# Patient Record
Sex: FEMALE | Race: BLACK OR AFRICAN AMERICAN | Employment: FULL TIME | URBAN - METROPOLITAN AREA
[De-identification: names, ages, dates, MRNs, and addresses within clinical notes are randomized per-mention and may not be internally consistent; named-entity substitution may affect disease eponyms.]

---

## 2017-02-09 ENCOUNTER — OFFICE VISIT (OUTPATIENT)
Dept: FAMILY MEDICINE | Facility: CLINIC | Age: 62
End: 2017-02-09

## 2017-02-09 VITALS
SYSTOLIC BLOOD PRESSURE: 142 MMHG | TEMPERATURE: 97.9 F | DIASTOLIC BLOOD PRESSURE: 90 MMHG | OXYGEN SATURATION: 97 % | WEIGHT: 171 LBS | RESPIRATION RATE: 18 BRPM | HEART RATE: 78 BPM | BODY MASS INDEX: 23.86 KG/M2

## 2017-02-09 DIAGNOSIS — R00.2 PALPITATIONS: ICD-10-CM

## 2017-02-09 DIAGNOSIS — R53.83 OTHER FATIGUE: ICD-10-CM

## 2017-02-09 DIAGNOSIS — G43.009 MIGRAINE WITHOUT AURA AND WITHOUT STATUS MIGRAINOSUS, NOT INTRACTABLE: ICD-10-CM

## 2017-02-09 DIAGNOSIS — Z23 NEED FOR INFLUENZA VACCINATION: ICD-10-CM

## 2017-02-09 DIAGNOSIS — H61.22 IMPACTED CERUMEN OF LEFT EAR: ICD-10-CM

## 2017-02-09 DIAGNOSIS — I10 BENIGN ESSENTIAL HYPERTENSION: Primary | ICD-10-CM

## 2017-02-09 LAB
% GRANULOCYTES: 61.2 %G (ref 40–75)
BUN SERPL-MCNC: 13.1 MG/DL (ref 7–19)
CALCIUM SERPL-MCNC: 10.2 MG/DL (ref 8.5–10.1)
CHLORIDE SERPLBLD-SCNC: 102.3 MMOL/L (ref 98–110)
CO2 SERPL-SCNC: 30.1 MMOL/L (ref 20–32)
CREAT SERPL-MCNC: 0.8 MG/DL (ref 0.5–1)
GFR SERPL CREATININE-BSD FRML MDRD: 78 ML/MIN/1.7 M2
GLUCOSE SERPL-MCNC: 100.6 MG'DL (ref 70–99)
GRANULOCYTES #: 2.5 K/UL (ref 1.6–8.3)
HCT VFR BLD AUTO: 39.1 % (ref 35–47)
HEMOGLOBIN: 12.3 G/DL (ref 11.7–15.7)
LYMPHOCYTES # BLD AUTO: 1.3 K/UL (ref 0.8–5.3)
LYMPHOCYTES NFR BLD AUTO: 32.6 %L (ref 20–48)
MCH RBC QN AUTO: 27 PG (ref 26.5–35)
MCHC RBC AUTO-ENTMCNC: 31.5 G/DL (ref 32–36)
MCV RBC AUTO: 85.9 FL (ref 78–100)
MID #: 0.3 K/UL (ref 0–2.2)
MID %: 6.2 %M (ref 0–20)
PLATELET # BLD AUTO: 261 K/UL (ref 150–450)
POTASSIUM SERPL-SCNC: 3.4 MMOL/DL (ref 3.3–4.5)
RBC # BLD AUTO: 4.55 M/UL (ref 3.8–5.2)
SODIUM SERPL-SCNC: 139.7 MMOL/L (ref 132.6–141.4)
TSH SERPL DL<=0.005 MIU/L-ACNC: 0.55 MU/L (ref 0.4–4)
WBC # BLD AUTO: 4.1 K/UL (ref 4–11)

## 2017-02-09 RX ORDER — CHOLECALCIFEROL (VITAMIN D3) 50 MCG
2000 TABLET ORAL DAILY
Qty: 100 TABLET | Refills: 3 | Status: SHIPPED | OUTPATIENT
Start: 2017-02-09

## 2017-02-09 RX ORDER — SUMATRIPTAN 50 MG/1
50 TABLET, FILM COATED ORAL
Qty: 18 TABLET | Refills: 3 | Status: SHIPPED | OUTPATIENT
Start: 2017-02-09 | End: 2017-04-12

## 2017-02-09 RX ORDER — HYDROCHLOROTHIAZIDE 12.5 MG/1
12.5 TABLET ORAL DAILY
Qty: 90 TABLET | Refills: 3 | Status: SHIPPED | OUTPATIENT
Start: 2017-02-09 | End: 2017-04-10

## 2017-02-09 ASSESSMENT — ENCOUNTER SYMPTOMS
SORE THROAT: 0
COUGH: 0
LIGHT-HEADEDNESS: 0
SHORTNESS OF BREATH: 0
WHEEZING: 0
RHINORRHEA: 0
HEADACHES: 1
PALPITATIONS: 1
DIZZINESS: 0
NECK PAIN: 1
ABDOMINAL PAIN: 0
FATIGUE: 0
NAUSEA: 0

## 2017-02-09 NOTE — PROGRESS NOTES
HPI:       Robert Marcial is a 61 year old who presents for the following  Patient presents with:  Ear Problem: left side ear pain   Anxiety: at night heart races       Migraine Follow-Up    Headaches symptoms:  Improved    Frequency: decreasing     Able to do normal daily activities/work with headaches: Yes    Rescue/Relief medication:sumatriptan (Imitrex), tylenol             Effectiveness: total relief    Preventative medication: None    Neurologic complications: No new stroke-like symptoms, loss of vision or speech, numbness or weakness    In the past 4 weeks, how often have you gone to Urgent Care or the emergency room because of your headaches?  0     Hypertension Follow-up      Outpatient blood pressures are being checked at home, every other day.  Results are 120-130/70-80.    Chest Pain? :No     Low Salt Diet: no added salt    Daily NSAID Use?No     Did patient take their HTN pills today/last night as usual?  Yes       Out of imitrix  Adherence and Exercise  Medication side effects: no  How often is a medication missed? Never  Are you able to follow the treatment plan? Yes  Exercise:walking  2-3 days/week for an average of 15-30 minutes     Palpitations: For the past several weeks she has experienced palpitations of the heart that occur only at night and resolve on their own after short duration. She denies any associated SOB, dizziness, chest pain, or lightheadedness. She drinks one cup of regular coffee in the morning. She reports family history of cardiovascular disease. She has had a stress ECG done in 2014 that was normal.      Ear Pain: For about a week she has experienced left ear discomfort described as fullness. She feels it could be related to her ongoing migraine and associated neck pain on the left side. She denies any changes in her hearing or tinnitus. She reports using que-tips to clean her ear. She washes her hair once a week.     Problem, Medication and Allergy Lists were reviewed  and are current.  Patient is an established patient of this clinic.         Review of Systems:   Review of Systems   Constitutional: Negative for fatigue.   HENT: Positive for ear pain (L). Negative for congestion, rhinorrhea and sore throat.    Respiratory: Negative for cough, shortness of breath and wheezing.    Cardiovascular: Positive for palpitations. Negative for chest pain.   Gastrointestinal: Negative for nausea and abdominal pain.   Musculoskeletal: Positive for neck pain.   Skin: Negative for rash.   Neurological: Positive for headaches. Negative for dizziness, syncope and light-headedness.             Physical Exam:     Patient Vitals for the past 24 hrs:   BP Temp Temp src Pulse Resp SpO2 Weight   02/09/17 0825 142/90 mmHg 97.9  F (36.6  C) Oral 78 18 97 % 171 lb (77.565 kg)     Body mass index is 23.86 kg/(m^2).  Vital signs normal except slightly elevated blood pressure     Physical Exam   Constitutional: She is oriented to person, place, and time. She appears well-developed and well-nourished. No distress.   HENT:   Head: Normocephalic and atraumatic.   Right Ear: Tympanic membrane normal.   Left ear with cerumen impaction. Unable to visualize TM.    Eyes: Conjunctivae and EOM are normal.   Neck: Neck supple.   Cardiovascular: Normal rate, regular rhythm and normal heart sounds.  Exam reveals no gallop and no friction rub.    No murmur heard.  Pulmonary/Chest: Effort normal and breath sounds normal. No respiratory distress. She has no wheezes. She has no rales.   Musculoskeletal: Normal range of motion. She exhibits no edema.   Neurological: She is alert and oriented to person, place, and time.   Skin: Skin is warm and dry. No rash noted. She is not diaphoretic.   Psychiatric: She has a normal mood and affect.   Vitals reviewed.        Results:      Results from the last 24 hours  Results for orders placed or performed in visit on 02/09/17 (from the past 24 hour(s))   CBC with Diff Plt ([facility])    Result Value Ref Range    WBC 4.1 4.0 - 11.0 K/uL    Lymphocytes # 1.3 0.8 - 5.3 K/uL    % Lymphocytes 32.6 20.0 - 48.0 %L    Mid # 0.3 0.0 - 2.2 K/uL    Mid % 6.2 0.0 - 20.0 %M    GRANULOCYTES # 2.5 1.6 - 8.3 K/uL    % Granulocytes 61.2 40.0 - 75.0 %G    RBC 4.55 3.80 - 5.20 M/uL    Hemoglobin 12.3 11.7 - 15.7 g/dL    Hematocrit 39.1 35.0 - 47.0 %    MCV 85.9 78.0 - 100.0 fL    MCH 27.0 26.5 - 35.0 pg    MCHC 31.5 (L) 32.0 - 36.0 g/dL    Platelets 261.0 150.0 - 450.0 K/uL   Basic Metabolic Panel ([facility])   Result Value Ref Range    Urea Nitrogen 13.1 7.0 - 19.0 mg/dL    Calcium 10.2 (H) 8.5 - 10.1 mg/dL    Chloride 102.3 98.0 - 110.0 mmol/L    Carbon Dioxide 30.1 20.0 - 32.0 mmol/L    Creatinine 0.8 0.5 - 1.0 mg/dL    Glucose 100.6 (H) 70.0 - 99.0 mg'dL    Potassium 3.4 3.3 - 4.5 mmol/dL    Sodium 139.7 132.6 - 141.4 mmol/L    GFR Estimate 78.0 mL/min/1.7 m2    GFR Estimate If Black 94.4 mL/min/1.7 m2     Assessment and Plan     1. Benign essential hypertension  Home monitoring has been within normal limits. No change in medication today. Meds refilled.   - hydrochlorothiazide 12.5 MG TABS tablet; Take 1 tablet (12.5 mg) by mouth daily  Dispense: 90 tablet; Refill: 3    2. Migraine without aura and without status migrainosus, not intractable  Ran out of Imitrex a week ago. This medication has worked effectively as a migraine  treatment for her however she seems to be using it several times a week. Discussed options of a preventative medicine but decided to wait to pursue this till after her heart palpitation problem has been investigated.   - SUMAtriptan (IMITREX) 50 MG tablet; Take 1 tablet (50 mg) by mouth at onset of headache for migraine May repeat dose in 2 hours.  Do not exceed 200 mg in 24 hours  Dispense: 18 tablet; Refill: 3    3. Heart Palpitations  DDX includes anxiety, hyperthyroidism, paroxysmal arrhythmia such as atrial fibrillation or runs of PVCs. Will check labs and get patient set up  with 48 hour heart monitor with plans for follow up.   - TSH with free T4 reflex  - CBC with Diff Plt ([facility])  - Zio Patch 48 Hours; Future  - Basic Metabolic Panel ([facility])    4. Cerumen impaction  Patient reporting discomfort in her hear and found to have large impaction of cerumen in left ear. Attempted retrieval with curette but was unsuccessful. Discussed Debrox with patient.   -Carbamide peroxide (DEBROX)      Medications Discontinued During This Encounter   Medication Reason     hydrochlorothiazide 12.5 MG TABS Reorder     SUMAtriptan (IMITREX) 50 MG tablet Reorder     Cholecalciferol (VITAMIN D) 2000 UNITS tablet Reorder     Options for treatment and follow-up care were reviewed with the patient. Robert Marcial  engaged in the decision making process and verbalized understanding of the options discussed and agreed with the final plan.    Audrey Medel DO    This note is scribed by Harjit Zavala, medical student on behalf of AUDREY MEDEL.    The medical student acted as scribe and the encounter documented above was completely performed by myself and the documentation reflects the work I have performed today. Audrey Medel DO

## 2017-02-09 NOTE — MR AVS SNAPSHOT
After Visit Summary   2/9/2017    Robert Marcial    MRN: [id number]           Patient Information     Date Of Birth          1955        Visit Information        Provider Department      2/9/2017 9:00 AM Tip Medel DO [facility]        Today's Diagnoses     Benign essential hypertension    -  1     Migraine without aura and without status migrainosus, not intractable         Other fatigue         Palpitations            Follow-ups after your visit        Future tests that were ordered for you today     Open Future Orders        Priority Expected Expires Ordered    Zio Patch 48 Hours Routine  3/26/2017 2/9/2017            Who to contact     Please call your clinic at [phone] to:    Ask questions about your health    Make or cancel appointments    Discuss your medicines    Learn about your test results    Speak to your doctor   If you have compliments or concerns about an experience at your clinic, or if you wish to file a complaint, please contact [facility] Patient Relations at [phone] or email us at [email or web address]         Additional Information About Your Visit        MyChart Information     [employer] gives you secure access to your electronic health record. If you see a primary care provider, you can also send messages to your care team and make appointments. If you have questions, please call your primary care clinic.  If you do not have a primary care provider, please call [phone] and they will assist you.      [employer] is an electronic gateway that provides easy, online access to your medical records. With [employer], you can request a clinic appointment, read your test results, renew a prescription or communicate with your care team.     To access your existing account, please contact your [facility] or call [phone] for assistance.        Care EveryWhere ID     This is your  Care EveryWhere ID. This could be used by other organizations to access your [facility] medical records  [id number]        Your Vitals Were     Pulse Temperature Respirations Pulse Oximetry Breastfeeding?       78 97.9  F (36.6  C) (Oral) 18 97% No        Blood Pressure from Last 3 Encounters:   02/09/17 142/90   10/20/16 113/74   07/25/16 115/81    Weight from Last 3 Encounters:   02/09/17 171 lb (77.565 kg)   10/20/16 166 lb (75.297 kg)   07/25/16 166 lb (75.297 kg)              We Performed the Following     Basic Metabolic Panel ([facility])     CBC with Diff Plt ([facility])     TSH with free T4 reflex          Where to get your medicines      These medications were sent to [facility] [place], [place] - [address] [place] [address]     Phone:  [phone]    - hydrochlorothiazide 12.5 MG Tabs tablet  - SUMAtriptan 50 MG tablet  - vitamin D 2000 UNITS tablet       Primary Care Provider Office Phone # Fax #    Tip Medel [phone]       [facility] [address]  [facility] [address]        Thank you!     Thank you for choosing [facility]  for your care. Our goal is always to provide you with excellent care. Hearing back from our patients is one way we can continue to improve our services. Please take a few minutes to complete the written survey that you may receive in the mail after your visit with us. Thank you!             Your Updated Medication List - Protect others around you: Learn how to safely use, store and throw away your medicines at www.disposemymeds.org.          This list is accurate as of: 2/9/17  9:31 AM.  Always use your most recent med list.                   Brand Name Dispense Instructions for use    hydrochlorothiazide 12.5 MG Tabs tablet     90 tablet    Take 1 tablet (12.5 mg) by mouth daily       NO ACTIVE MEDICATIONS          order for DME     1 each    Equipment being ordered: blood pressure cuff        SUMAtriptan 50 MG tablet    IMITREX    18 tablet    Take 1 tablet (50 mg) by mouth at onset of headache for migraine May repeat dose in 2 hours.  Do not exceed 200 mg in 24 hours       vitamin D 2000 UNITS tablet     100 tablet    Take 2,000 Units by mouth daily

## 2017-02-15 NOTE — PROGRESS NOTES
Preceptor Attestation:   Patient seen and discussed with the resident. Assessment and plan reviewed with resident and agreed upon.   Supervising Physician:  Marcelina Holly MD

## 2017-04-10 ENCOUNTER — OFFICE VISIT (OUTPATIENT)
Dept: FAMILY MEDICINE | Facility: CLINIC | Age: 62
End: 2017-04-10

## 2017-04-10 VITALS
HEART RATE: 64 BPM | TEMPERATURE: 98.2 F | RESPIRATION RATE: 18 BRPM | WEIGHT: 171 LBS | BODY MASS INDEX: 23.94 KG/M2 | DIASTOLIC BLOOD PRESSURE: 84 MMHG | SYSTOLIC BLOOD PRESSURE: 133 MMHG | HEIGHT: 71 IN | OXYGEN SATURATION: 98 %

## 2017-04-10 DIAGNOSIS — E87.6 HYPOKALEMIA: ICD-10-CM

## 2017-04-10 DIAGNOSIS — Z12.31 ENCOUNTER FOR SCREENING MAMMOGRAM FOR BREAST CANCER: ICD-10-CM

## 2017-04-10 DIAGNOSIS — G43.009 MIGRAINE WITHOUT AURA AND WITHOUT STATUS MIGRAINOSUS, NOT INTRACTABLE: Primary | ICD-10-CM

## 2017-04-10 DIAGNOSIS — I10 BENIGN ESSENTIAL HYPERTENSION: ICD-10-CM

## 2017-04-10 LAB
BUN SERPL-MCNC: 11.5 MG/DL (ref 7–19)
CALCIUM SERPL-MCNC: 9.6 MG/DL (ref 8.5–10.1)
CHLORIDE SERPLBLD-SCNC: 99.7 MMOL/L (ref 98–110)
CO2 SERPL-SCNC: 31.8 MMOL/L (ref 20–32)
CREAT SERPL-MCNC: 0.7 MG/DL (ref 0.5–1)
GFR SERPL CREATININE-BSD FRML MDRD: >90 ML/MIN/1.7 M2
GLUCOSE SERPL-MCNC: 125.1 MG'DL (ref 70–99)
POTASSIUM SERPL-SCNC: 3.1 MMOL/DL (ref 3.3–4.5)
SODIUM SERPL-SCNC: 139.1 MMOL/L (ref 132.6–141.4)

## 2017-04-10 RX ORDER — HYDROCHLOROTHIAZIDE 12.5 MG/1
12.5 TABLET ORAL DAILY
Qty: 90 TABLET | Refills: 3 | Status: SHIPPED | OUTPATIENT
Start: 2017-04-10 | End: 2017-05-08

## 2017-04-10 RX ORDER — AMITRIPTYLINE HYDROCHLORIDE 10 MG/1
10 TABLET ORAL AT BEDTIME
Qty: 90 TABLET | Refills: 1 | Status: SHIPPED | OUTPATIENT
Start: 2017-04-10

## 2017-04-10 NOTE — MR AVS SNAPSHOT
After Visit Summary   4/10/2017    Robert Marcial    MRN: [id number]           Patient Information     Date Of Birth          1955        Visit Information        Provider Department      4/10/2017 4:00 PM Tip Medel DO [facility]        Today's Diagnoses     Migraine without aura and without status migrainosus, not intractable    -  1    Benign essential hypertension        Encounter for screening mammogram for breast cancer           Follow-ups after your visit        Your next 10 appointments already scheduled     Apr 10, 2017  4:00 PM CDT   Return Visit with DO [facility] ([facility])    [address] [place] [address],  [address]  [facility] [address]   [phone]              Future tests that were ordered for you today     Open Future Orders        Priority Expected Expires Ordered    Screening Mammogram Digital Bilateral Routine  4/10/2018 4/10/2017            Who to contact     Please call your clinic at [phone] to:    Ask questions about your health    Make or cancel appointments    Discuss your medicines    Learn about your test results    Speak to your doctor   If you have compliments or concerns about an experience at your clinic, or if you wish to file a complaint, please contact [facility] Patient Relations at [phone] or email us at [email or web address]         Additional Information About Your Visit        MyChart Information     [facility] gives you secure access to your electronic health record. If you see a primary care provider, you can also send messages to your care team and make appointments. If you have questions, please call your primary care clinic.  If you do not have a primary care provider, please call [phone] and they will assist you.      [facility] is an electronic gateway that provides easy, online access to your medical records. With [facility], you can  "request a clinic appointment, read your test results, renew a prescription or communicate with your care team.     To access your existing account, please contact your [facility] or call [phone] for assistance.        Care EveryWhere ID     This is your Care EveryWhere ID. This could be used by other organizations to access your [facility] medical records  [id number]        Your Vitals Were     Pulse Temperature Respirations Height Pulse Oximetry BMI (Body Mass Index)    64 98.2  F (36.8  C) (Oral) 18 5' 11\" (180.3 cm) 98% 23.85 kg/m2       Blood Pressure from Last 3 Encounters:   04/10/17 133/84   02/09/17 142/90   10/20/16 113/74    Weight from Last 3 Encounters:   04/10/17 171 lb (77.6 kg)   02/09/17 171 lb (77.6 kg)   10/20/16 166 lb (75.3 kg)              We Performed the Following     Basic Metabolic Panel ([facility])          Today's Medication Changes          These changes are accurate as of: 4/10/17  3:53 PM.  If you have any questions, ask your nurse or doctor.               Start taking these medicines.        Dose/Directions    amitriptyline 10 MG tablet   Commonly known as:  ELAVIL   Used for:  Migraine without aura and without status migrainosus, not intractable   Started by:  Tip Medel DO        Dose:  10 mg   Take 1 tablet (10 mg) by mouth At Bedtime   Quantity:  90 tablet   Refills:  1            Where to get your medicines      These medications were sent to [facility] [place], [place] - [address] [place] [id number]     Phone:  [phone]     amitriptyline 10 MG tablet    hydrochlorothiazide 12.5 MG Tabs tablet                Primary Care Provider Office Phone # Fax #    Tip Medel [phone]       [facility] [address]  [facility] [address]        Thank you!     Thank you for choosing [facility]  for your care. Our goal is always to provide you with excellent care. " Hearing back from our patients is one way we can continue to improve our services. Please take a few minutes to complete the written survey that you may receive in the mail after your visit with us. Thank you!             Your Updated Medication List - Protect others around you: Learn how to safely use, store and throw away your medicines at www.disposemymeds.org.          This list is accurate as of: 4/10/17  3:53 PM.  Always use your most recent med list.                   Brand Name Dispense Instructions for use    amitriptyline 10 MG tablet    ELAVIL    90 tablet    Take 1 tablet (10 mg) by mouth At Bedtime       carbamide peroxide 6.5 % otic solution    DEBROX    30 mL    Place 10 drops Into the left ear daily as needed for other       hydrochlorothiazide 12.5 MG Tabs tablet     90 tablet    Take 1 tablet (12.5 mg) by mouth daily       NO ACTIVE MEDICATIONS          order for DME     1 each    Equipment being ordered: blood pressure cuff       SUMAtriptan 50 MG tablet    IMITREX    18 tablet    Take 1 tablet (50 mg) by mouth at onset of headache for migraine May repeat dose in 2 hours.  Do not exceed 200 mg in 24 hours       vitamin D 2000 UNITS tablet     100 tablet    Take 2,000 Units by mouth daily

## 2017-04-10 NOTE — LETTER
[facility]   [address],  [address]  [facility] [address]  [phone]    April 10, 2017    Robert Marcial  [address] [facility] [address]  [phone] (home)     : 1955      To Whom it may concern:    Robert Marcial was seen in our clinic today, April 10, 2017.  I think the pain in her left arm is associated with her typing ergonomics.  I think she would benefit from a separate keyboard and mouse to improve ergonomics.      Sincerely,        Tip Medel, DO

## 2017-04-10 NOTE — PROGRESS NOTES
Preceptor Attestation:   Patient seen and discussed with the resident. Assessment and plan reviewed with resident and agreed upon.   Supervising Physician:  Joe Walker MD  [facility]

## 2017-04-10 NOTE — PROGRESS NOTES
"      HPI:       Robert Marcial is a 62 year old who presents for the following  Patient presents with:  Pain: cramping, left arm, stiff, sore,8/10 pain scale, Hs, pt requesting lab results, left foot swelling  Refill Request: Medication refill    Left foot swelling.  For a few days.  Very mild.  No pain in calf      Has some pain in her left arm.  Just feels like a sore, stiffness.  Rearranged her seating area.  Types on her laptop at this point.      Headaches.  Have been slowing down.  Gets them at least 3 days a week.             Problem, Medication and Allergy Lists were reviewed and are current.  Patient is an established patient of this clinic.         Review of Systems:   Review of Systems   Constitutional: Negative for chills and fever.   HENT: Negative for congestion and sore throat.    Eyes: Negative for visual disturbance.   Respiratory: Negative for cough and shortness of breath.    Cardiovascular: Negative for chest pain.   Gastrointestinal: Negative for constipation, diarrhea, nausea and vomiting.   Genitourinary: Negative for difficulty urinating.   Musculoskeletal: Negative for back pain.        Left arm pain   Skin: Negative for rash.   Neurological: Positive for headaches. Negative for dizziness and light-headedness.   Psychiatric/Behavioral: Negative for dysphoric mood.             Physical Exam:   Patient Vitals for the past 24 hrs:   BP Temp Temp src Pulse Resp SpO2 Height Weight   04/10/17 1523 133/84 98.2  F (36.8  C) Oral 64 18 98 % 5' 11\" (180.3 cm) 171 lb (77.6 kg)     Body mass index is 23.85 kg/(m^2).  Vitals were reviewed and were normal     Physical Exam   Constitutional: She is oriented to person, place, and time. She appears well-developed and well-nourished.   HENT:   Head: Normocephalic and atraumatic.   Eyes: Conjunctivae and EOM are normal.   Cardiovascular: Normal rate, regular rhythm and normal heart sounds.  Exam reveals no gallop and no friction rub.    No murmur " heard.  Pulmonary/Chest: Effort normal and breath sounds normal. No respiratory distress. She has no wheezes. She has no rales. She exhibits no tenderness.   Abdominal: She exhibits no distension.   Musculoskeletal:   No pain in shoulder.  Pain significant at lateral epicondyle of humerus on left. Wrist normal   Neurological: She is alert and oriented to person, place, and time. No cranial nerve deficit.   Skin: Skin is warm and dry.   Psychiatric: She has a normal mood and affect. Her behavior is normal. Judgment and thought content normal.         Results:     Results for orders placed or performed in visit on 04/10/17   Basic Metabolic Panel ([facility])   Result Value Ref Range    Urea Nitrogen 11.5 7.0 - 19.0 mg/dL    Calcium 9.6 8.5 - 10.1 mg/dL    Chloride 99.7 98.0 - 110.0 mmol/L    Carbon Dioxide 31.8 20.0 - 32.0 mmol/L    Creatinine 0.7 0.5 - 1.0 mg/dL    Glucose 125.1 (H) 70.0 - 99.0 mg'dL    Potassium 3.1 (L) 3.3 - 4.5 mmol/dL    Sodium 139.1 132.6 - 141.4 mmol/L    GFR Estimate >90 >60.0 mL/min/1.7 m2    GFR Estimate If Black >90 >60.0 mL/min/1.7 m2       Assessment and Plan     1. Migraine without aura and without status migrainosus, not intractable  Will attempt a controller medication given that it happens 3 times a week.    - amitriptyline (ELAVIL) 10 MG tablet; Take 1 tablet (10 mg) by mouth At Bedtime  Dispense: 90 tablet; Refill: 1    2. Benign essential hypertension  Will need to consider potassium supplementation  - hydrochlorothiazide 12.5 MG TABS tablet; Take 1 tablet (12.5 mg) by mouth daily  Dispense: 90 tablet; Refill: 3  - Basic Metabolic Panel ([facility])    3. Encounter for screening mammogram for breast cancer  - Screening Mammogram Digital Bilateral; Future  There are no discontinued medications.  Options for treatment and follow-up care were reviewed with the patient. Robert Marcial  engaged in the decision making process and verbalized understanding of the options discussed and  agreed with the final plan.    Tip Medel, DO

## 2017-04-11 ENCOUNTER — TELEPHONE (OUTPATIENT)
Dept: FAMILY MEDICINE | Facility: CLINIC | Age: 62
End: 2017-04-11

## 2017-04-11 DIAGNOSIS — G43.009 MIGRAINE WITHOUT AURA AND WITHOUT STATUS MIGRAINOSUS, NOT INTRACTABLE: ICD-10-CM

## 2017-04-11 NOTE — TELEPHONE ENCOUNTER
"[facility] phone call message- medication clarification/question:    Full Medication Name: amitriptyline (ELAVIL) 10 MG tablet    Question: Patient states she takes the above medication for headaches, but does not like the side effects. She states she takes it at night, but feels drowsy and groggy the next day. She would like to continue taking what she was on previously for headaches, but did not know the name. She states the medication comes \"in a box\".     Pharmacy confirmed as   [facility]/pharmacy #[id number] - [address] [address]  Phone: [phone] Fax: [phone]  : Yes    OK to leave a message on voice mail? Yes    Call taken on April 11, 2017 at 11:55 AM by Rafy Cardona    "

## 2017-04-11 NOTE — TELEPHONE ENCOUNTER
Message routed to PCP to prescribe requested previous medication if appropriate.    Wendy Hughes RN

## 2017-04-12 RX ORDER — SUMATRIPTAN 50 MG/1
50 TABLET, FILM COATED ORAL
Qty: 18 TABLET | Refills: 3 | Status: SHIPPED | OUTPATIENT
Start: 2017-04-12

## 2017-04-12 NOTE — TELEPHONE ENCOUNTER
Please let her know it is okay to stop the amitriptyline.  I will send in a new prescription for sumatriptan, which is the one she takes for her headaches that works really well.  Please let her know that we have several options for prevention medications and I would like to try another one in a few weeks.  I think she would benefit from preventing them and I have confidence that we will find one that will work for her and will not bother her in terms of side effects.  Please have her schedule in a 2-3 weeks with me if possible.      Thanks,    Tip

## 2017-04-20 ENCOUNTER — TELEPHONE (OUTPATIENT)
Dept: FAMILY MEDICINE | Facility: CLINIC | Age: 62
End: 2017-04-20

## 2017-04-20 RX ORDER — POTASSIUM CHLORIDE 750 MG/1
10 CAPSULE, EXTENDED RELEASE ORAL 2 TIMES DAILY
Qty: 60 CAPSULE | Refills: 1 | Status: SHIPPED | OUTPATIENT
Start: 2017-04-20

## 2017-04-20 ASSESSMENT — ENCOUNTER SYMPTOMS
CHILLS: 0
CONSTIPATION: 0
SHORTNESS OF BREATH: 0
LIGHT-HEADEDNESS: 0
SORE THROAT: 0
COUGH: 0
DYSPHORIC MOOD: 0
FEVER: 0
DIFFICULTY URINATING: 0
NAUSEA: 0
DIZZINESS: 0
BACK PAIN: 0
HEADACHES: 1
VOMITING: 0
DIARRHEA: 0

## 2017-04-20 NOTE — TELEPHONE ENCOUNTER
Called patient and relayed message below.    Verbalized understanding. No further questions.    Lauryn Jacob RN

## 2017-04-20 NOTE — TELEPHONE ENCOUNTER
----- Message from Tip Medel DO sent at 4/20/2017  6:57 AM CDT -----  Regarding: please call about results  Please call patient with this information. Pt has a mildly low potassium.  Likely from the hydrochlorathiazide.  Has normal kidney function.  Recommend using a potassium supplement which I have sent to the pharmacy daily.  Because she has normal kidneys she can clear extra potassium, I just don't want it to go too low because of the HCTZ.      Thanks,  Tip Medel D.O.  [facility] Resident  [phone]

## 2017-05-08 ENCOUNTER — TELEPHONE (OUTPATIENT)
Dept: FAMILY MEDICINE | Facility: CLINIC | Age: 62
End: 2017-05-08

## 2017-05-08 DIAGNOSIS — I10 BENIGN ESSENTIAL HYPERTENSION: ICD-10-CM

## 2017-05-08 RX ORDER — HYDROCHLOROTHIAZIDE 25 MG/1
12.5 TABLET ORAL DAILY
Qty: 90 TABLET | Refills: 1 | Status: SHIPPED | OUTPATIENT
Start: 2017-05-08

## 2017-05-08 NOTE — TELEPHONE ENCOUNTER
Per telephone call to Pt's Insurance Hydrochlorothiazide 12.5mg Tablets are no longer a preferred product. Please, send new script for HCTZ 25mg Tablets - One-half Tab daily.     Thank You  Karen Costa CPhT, [facility]  [phone]  [email or web address]

## 2018-04-21 ENCOUNTER — HEALTH MAINTENANCE LETTER (OUTPATIENT)
Age: 63
End: 2018-04-21

## 2019-10-02 ENCOUNTER — HEALTH MAINTENANCE LETTER (OUTPATIENT)
Age: 64
End: 2019-10-02

## 2019-11-25 ENCOUNTER — TELEPHONE (OUTPATIENT)
Dept: ORTHOPEDICS | Facility: CLINIC | Age: 64
End: 2019-11-25

## 2019-11-25 NOTE — TELEPHONE ENCOUNTER
Robert Marcial is a 64 year old female who left message stating she is currently seeing an orthopedic physician at [facility]. She would like to transfer her care and get records transferred to be seen here for hip pain. Is having difficulty walking and getting in and out of her car. She would like to see a surgeon.     Phone call to patient. She states she has her workup all done now for a total hip replacement at [facility]. She just got cleared by her dentist and completed a pre-op physical. She called [facility] back to schedule and they don't have anything available until January. She is in a lot of pain and feels they don't care about her. She would like to transfer her care here to see if she can get surgery here done sooner.   Informed that many people try to get surgeries done at the end of the year due to deductibles being met and it fills up fast. Will have to check with our surgery scheduler to see if we would have availability to get her in before January and get back with her.     She can be reached at: [phone]  Ok to leave message : YES    Please check with surgery scheduling regarding availability. It may not be in patient's best interest to transfer her care if we do not have availability.     JOHNY Springer RN

## 2019-11-26 NOTE — TELEPHONE ENCOUNTER
Attempted to reach patient regarding patient questions about surgery &  availability for 2019: left vmail for patient to return call.      Cosmo Das, Surgery Scheduler

## 2019-12-02 NOTE — TELEPHONE ENCOUNTER
Patient returned call.  Explained to patient that we can schedule an appt for patient to have a 2nd opinion but can not guarantee scheduling surgery before the end of the year due to the high patient volume/hospital available times at this time of the year. Patient declined to move forward with 2nd opinion and will stay with her current provider.      Cosmo Das, Surgery Scheduler

## 2019-12-02 NOTE — TELEPHONE ENCOUNTER
2nd call: attempted to reach patient to follow up on surgery question. Left vmail for patient to return call.  Closing encounter.       Cosmo Das, Surgery Scheduler

## 2020-03-22 ENCOUNTER — HEALTH MAINTENANCE LETTER (OUTPATIENT)
Age: 65
End: 2020-03-22

## 2021-01-15 ENCOUNTER — HEALTH MAINTENANCE LETTER (OUTPATIENT)
Age: 66
End: 2021-01-15

## 2021-09-04 ENCOUNTER — HEALTH MAINTENANCE LETTER (OUTPATIENT)
Age: 66
End: 2021-09-04

## 2021-10-30 ENCOUNTER — HEALTH MAINTENANCE LETTER (OUTPATIENT)
Age: 66
End: 2021-10-30

## 2022-02-19 ENCOUNTER — HEALTH MAINTENANCE LETTER (OUTPATIENT)
Age: 67
End: 2022-02-19

## 2022-06-11 ENCOUNTER — HEALTH MAINTENANCE LETTER (OUTPATIENT)
Age: 67
End: 2022-06-11

## 2022-10-22 ENCOUNTER — HEALTH MAINTENANCE LETTER (OUTPATIENT)
Age: 67
End: 2022-10-22

## 2023-06-18 ENCOUNTER — HEALTH MAINTENANCE LETTER (OUTPATIENT)
Age: 68
End: 2023-06-18

## 2023-11-05 ENCOUNTER — HEALTH MAINTENANCE LETTER (OUTPATIENT)
Age: 68
End: 2023-11-05